# Patient Record
Sex: MALE | Race: WHITE
[De-identification: names, ages, dates, MRNs, and addresses within clinical notes are randomized per-mention and may not be internally consistent; named-entity substitution may affect disease eponyms.]

---

## 2017-05-05 ENCOUNTER — HOSPITAL ENCOUNTER (OUTPATIENT)
Dept: HOSPITAL 62 - END | Age: 42
Discharge: HOME | End: 2017-05-05
Attending: INTERNAL MEDICINE
Payer: COMMERCIAL

## 2017-05-05 VITALS — SYSTOLIC BLOOD PRESSURE: 123 MMHG | DIASTOLIC BLOOD PRESSURE: 74 MMHG

## 2017-05-05 DIAGNOSIS — K62.5: ICD-10-CM

## 2017-05-05 DIAGNOSIS — K29.50: ICD-10-CM

## 2017-05-05 DIAGNOSIS — Z88.1: ICD-10-CM

## 2017-05-05 DIAGNOSIS — K21.0: ICD-10-CM

## 2017-05-05 DIAGNOSIS — Z88.0: ICD-10-CM

## 2017-05-05 DIAGNOSIS — K22.2: Primary | ICD-10-CM

## 2017-05-05 DIAGNOSIS — Z79.899: ICD-10-CM

## 2017-05-05 PROCEDURE — 88305 TISSUE EXAM BY PATHOLOGIST: CPT

## 2017-05-05 PROCEDURE — 0DB58ZX EXCISION OF ESOPHAGUS, VIA NATURAL OR ARTIFICIAL OPENING ENDOSCOPIC, DIAGNOSTIC: ICD-10-PCS | Performed by: INTERNAL MEDICINE

## 2017-05-05 PROCEDURE — 88342 IMHCHEM/IMCYTCHM 1ST ANTB: CPT

## 2017-05-05 PROCEDURE — 43239 EGD BIOPSY SINGLE/MULTIPLE: CPT

## 2017-05-05 PROCEDURE — 0DB68ZX EXCISION OF STOMACH, VIA NATURAL OR ARTIFICIAL OPENING ENDOSCOPIC, DIAGNOSTIC: ICD-10-PCS | Performed by: INTERNAL MEDICINE

## 2017-05-05 RX ADMIN — MIDAZOLAM HYDROCHLORIDE ONE MG: 1 INJECTION, SOLUTION INTRAMUSCULAR; INTRAVENOUS at 11:41

## 2017-05-05 RX ADMIN — MIDAZOLAM HYDROCHLORIDE ONE MG: 1 INJECTION, SOLUTION INTRAMUSCULAR; INTRAVENOUS at 11:38

## 2017-05-05 NOTE — OPERATIVE REPORT
Operative Report


DATE OF SURGERY: 05/05/17


Operative Report: 


The risks benefits and alternatives of the procedure explained to the patient 

in detail and informed consent is obtained that GIF Olympus video scope was 

inserted into the patient's mouth and hypopharynx the esophagus is identified 

intubated and insufflated the scope was then advanced through the esophagus 

stomach and duodenum retroflexion maneuver is done the esophagus stomach and 

first and second portions of the duodenum examined


PREOPERATIVE DIAGNOSIS: GERD.  Epigastric pain.  Use of doxycycline in the past


POSTOPERATIVE DIAGNOSIS: Esophageal rings and furrows status post biopsy.  

Gastritis as was biopsy rule out Helicobacter pylori.  Distal esophageal 

inflammation suggestive of a pill-induced esophagitis


OPERATION: EGD with biopsy


SURGEON: AMRIK LEON


ANESTHESIA: Moderate Sedation - 3 mg of Versed, 100g of fentanyl.  Conscious 

sedation monitoring time 30 minutes.


TISSUE REMOVED OR ALTERED: Esophageal specimens obtained.  Gastric specimens 

obtained


COMPLICATIONS: 


None.


ESTIMATED BLOOD LOSS: none.


INTRAOPERATIVE FINDINGS: As noted above.


PROCEDURE: 


Patient tolerated procedure well.


No immediate postprocedure complications are noted.


Patient discharged in good condition.


Discharge date 05/05/2017.


Discharge diet: Regular.


Discharge activity: Regular.


2-3 week follow-up to discuss findings.


Patient is instructed call the office or proceed to the emergency room should 

there be any further problems or questions.


We'll await on biopsies

## 2018-08-19 ENCOUNTER — HOSPITAL ENCOUNTER (EMERGENCY)
Dept: HOSPITAL 62 - ER | Age: 43
Discharge: HOME | End: 2018-08-19
Payer: SELF-PAY

## 2018-08-19 VITALS — DIASTOLIC BLOOD PRESSURE: 85 MMHG | SYSTOLIC BLOOD PRESSURE: 127 MMHG

## 2018-08-19 DIAGNOSIS — Z88.0: ICD-10-CM

## 2018-08-19 DIAGNOSIS — R07.9: Primary | ICD-10-CM

## 2018-08-19 DIAGNOSIS — Z88.3: ICD-10-CM

## 2018-08-19 DIAGNOSIS — R06.02: ICD-10-CM

## 2018-08-19 LAB
ADD MANUAL DIFF: NO
ALBUMIN SERPL-MCNC: 4.5 G/DL (ref 3.5–5)
ALP SERPL-CCNC: 92 U/L (ref 38–126)
ALT SERPL-CCNC: 37 U/L (ref 21–72)
ANION GAP SERPL CALC-SCNC: 11 MMOL/L (ref 5–19)
AST SERPL-CCNC: 37 U/L (ref 17–59)
BASOPHILS # BLD AUTO: 0 10^3/UL (ref 0–0.2)
BASOPHILS NFR BLD AUTO: 0.5 % (ref 0–2)
BILIRUB DIRECT SERPL-MCNC: 0.3 MG/DL (ref 0–0.4)
BILIRUB SERPL-MCNC: 0.7 MG/DL (ref 0.2–1.3)
BUN SERPL-MCNC: 20 MG/DL (ref 7–20)
CALCIUM: 9.6 MG/DL (ref 8.4–10.2)
CHLORIDE SERPL-SCNC: 102 MMOL/L (ref 98–107)
CO2 SERPL-SCNC: 30 MMOL/L (ref 22–30)
EOSINOPHIL # BLD AUTO: 0 10^3/UL (ref 0–0.6)
EOSINOPHIL NFR BLD AUTO: 1 % (ref 0–6)
ERYTHROCYTE [DISTWIDTH] IN BLOOD BY AUTOMATED COUNT: 12.6 % (ref 11.5–14)
GLUCOSE SERPL-MCNC: 98 MG/DL (ref 75–110)
HCT VFR BLD CALC: 43.5 % (ref 37.9–51)
HGB BLD-MCNC: 15.4 G/DL (ref 13.5–17)
LYMPHOCYTES # BLD AUTO: 1.3 10^3/UL (ref 0.5–4.7)
LYMPHOCYTES NFR BLD AUTO: 28.1 % (ref 13–45)
MCH RBC QN AUTO: 32.7 PG (ref 27–33.4)
MCHC RBC AUTO-ENTMCNC: 35.3 G/DL (ref 32–36)
MCV RBC AUTO: 93 FL (ref 80–97)
MONOCYTES # BLD AUTO: 0.2 10^3/UL (ref 0.1–1.4)
MONOCYTES NFR BLD AUTO: 4.6 % (ref 3–13)
NEUTROPHILS # BLD AUTO: 3 10^3/UL (ref 1.7–8.2)
NEUTS SEG NFR BLD AUTO: 65.8 % (ref 42–78)
PLATELET # BLD: 161 10^3/UL (ref 150–450)
POTASSIUM SERPL-SCNC: 4.2 MMOL/L (ref 3.6–5)
PROT SERPL-MCNC: 7.2 G/DL (ref 6.3–8.2)
RBC # BLD AUTO: 4.71 10^6/UL (ref 4.35–5.55)
SODIUM SERPL-SCNC: 142.9 MMOL/L (ref 137–145)
TOTAL CELLS COUNTED % (AUTO): 100 %
WBC # BLD AUTO: 4.6 10^3/UL (ref 4–10.5)

## 2018-08-19 PROCEDURE — 85025 COMPLETE CBC W/AUTO DIFF WBC: CPT

## 2018-08-19 PROCEDURE — 93005 ELECTROCARDIOGRAM TRACING: CPT

## 2018-08-19 PROCEDURE — 84484 ASSAY OF TROPONIN QUANT: CPT

## 2018-08-19 PROCEDURE — 99285 EMERGENCY DEPT VISIT HI MDM: CPT

## 2018-08-19 PROCEDURE — 93010 ELECTROCARDIOGRAM REPORT: CPT

## 2018-08-19 PROCEDURE — 36415 COLL VENOUS BLD VENIPUNCTURE: CPT

## 2018-08-19 PROCEDURE — 71045 X-RAY EXAM CHEST 1 VIEW: CPT

## 2018-08-19 PROCEDURE — 80053 COMPREHEN METABOLIC PANEL: CPT

## 2018-08-19 NOTE — RADIOLOGY REPORT (SQ)
EXAM DESCRIPTION: 



XR CHEST 1 VIEW



COMPLETED DATE/TME:  08/19/2018 01:22



CLINICAL HISTORY: 



42 years, Male, chest pain



COMPARISON:

None.



NUMBER OF VIEWS:

One



TECHNIQUE:

AP view of the chest



LIMITATIONS:

None.



FINDINGS:



The lungs are clear. The heart is normal in size. There is no

pneumothorax or pleural effusion. There is no acute fracture



IMPRESSION:



No acute cardiopulmonary abnormality

 



 2011 [employer]- All Rights Reserved

## 2018-08-19 NOTE — ER DOCUMENT REPORT
ED Medical Screen (RME)





- General


Chief Complaint: Chest Pain


Stated Complaint: SHORT OF BREATH


Time Seen by Provider: 08/19/18 01:19


Notes: 





Patient presents for concern of chest pain.  Patient states that approximately 

between 7 and 8 PM today he began experiencing pressure in his midsternal chest 

region.  He then began to have some mild shortness of breath and numbness going 

down his left arm.  He has never experienced these symptoms before.  He took a 

total of 3 [place] in the next several hours after the symptoms began and 

ultimately came to the emergency department for concern of his chest pain.  He 

states he is currently having mild discomfort in his chest and he still has 

tingling in his hand but the symptoms have subsided since initial onset.


He states he exercises daily, does not take any drugs, and is an occasional 

drinker of beer. No known medical problems. 


His primary concern is due to his father having a massive heart attack and 

dying unexpectedly at the age of 59 but no other concerning family history.


He has not had any dyspnea with exertion as he does exercise daily.


TRAVEL OUTSIDE OF THE [place] IN LAST 30 DAYS: No





- Related Data


Allergies/Adverse Reactions: 


 





doxycycline Adverse Reaction (Verified 05/05/17 10:29)


 Rash


Penicillins Adverse Reaction (Verified 05/05/17 10:29)


 Rash


MYCINS Allergy (Uncoded 05/05/17 10:29)


 Rash











Past Medical History





- Past Medical History


Cardiac Medical History: 


   Denies: Hx Coronary Artery Disease, Hx Heart Attack, Hx Hypertension


Pulmonary Medical History: 


   Denies: Hx Asthma, Hx Bronchitis, Hx COPD, Hx Pneumonia


Neurological Medical History: Denies: Hx Cerebrovascular Accident, Hx Seizures


Musculoskeltal Medical History: Denies Hx Arthritis





- Immunizations


Hx Diphtheria, Pertussis, Tetanus Vaccination: No - UNSURE





Physical Exam





- Vital signs


Vitals: 





 











Temp Pulse Resp BP Pulse Ox


 


 99.0 F   82   15   141/88 H  98 


 


 08/19/18 00:21  08/19/18 00:21  08/19/18 00:21 08/19/18 00:21  08/19/18 00:21














Course





- Vital Signs


Vital signs: 





 











Temp Pulse Resp BP Pulse Ox


 


 99.0 F   82   15   141/88 H  98 


 


 08/19/18 00:21  08/19/18 00:21  08/19/18 00:21  08/19/18 00:21  08/19/18 00:21

## 2018-08-19 NOTE — ER DOCUMENT REPORT
ED General





- General


Chief Complaint: Chest Pain


Stated Complaint: SHORT OF BREATH


Time Seen by Provider: 18 01:19


Notes: 


Patient is a 42-year-old male that comes by EMS for chief complaint of chest 

pain.  He states symptoms started about 7 PM, he states it felt like it was a 

pinching between his shoulder blades with a tightness in his chest, he states 

he got "really anxious about it" and started feeling increased tightness and 

numbness in his left arm.  She denies shortness of breath, dizziness, passing 

out, nausea, or vomiting.  He states he did suddenly feel like he could not eat 

however.  He denies smoking, he drinks alcohol daily, he denies any 

recreational drugs.  He denies any daily medications.  He had an endoscopy once 

and he was told he had an ulcer, he used to take omeprazole for this but no 

longer takes it.  He denies any current symptoms, symptoms resolved in less 

than an hour.  Positive family history of MI, states his father  from heart 

attack at about 60.


TRAVEL OUTSIDE OF THE [place] IN LAST 30 DAYS: No





- Related Data


Allergies/Adverse Reactions: 


 





doxycycline Adverse Reaction (Verified 17 10:29)


 Rash


Penicillins Adverse Reaction (Verified 17 10:29)


 Rash


MYCINS Allergy (Uncoded 17 10:29)


 Rash











Past Medical History





- General


Information source: Patient





- Social History


Smoking Status: Never Smoker


Chew tobacco use (# tins/day): No


Frequency of alcohol use: Daily


Drug Abuse: None


Lives with: Family


Family History: CAD


Patient has suicidal ideation: No


Patient has homicidal ideation: No





- Medical History


Medical History: Negative





- Past Medical History


Cardiac Medical History: 


   Denies: Hx Coronary Artery Disease, Hx Heart Attack, Hx Hypertension


Pulmonary Medical History: 


   Denies: Hx Asthma, Hx Bronchitis, Hx COPD, Hx Pneumonia


Neurological Medical History: Denies: Hx Cerebrovascular Accident, Hx Seizures


Renal/ Medical History: Denies: Hx Peritoneal Dialysis


Musculoskeletal Medical History: Denies Hx Arthritis


Surgical Hx: Negative





- Immunizations


Hx Diphtheria, Pertussis, Tetanus Vaccination: Yes





Review of Systems





- Review of Systems


Constitutional: No symptoms reported


EENT: No symptoms reported


Cardiovascular: See HPI


Respiratory: No symptoms reported


Gastrointestinal: See HPI


Genitourinary: No symptoms reported


Male Genitourinary: No symptoms reported


Musculoskeletal: No symptoms reported


Skin: No symptoms reported


Hematologic/Lymphatic: No symptoms reported


Neurological/Psychological: See HPI





Physical Exam





- Vital signs


Vitals: 


 











Temp Pulse Resp BP Pulse Ox


 


 99.0 F   82   15   141/88 H  98 


 


 18 00:21  18 00:21  18 00:21  18 00:21  18 00:21














- Notes


Notes: 





GENERAL: Alert, interacts well. No acute distress.


HEAD: Normocephalic, atraumatic.


EYES: Pupils equal, round, and reactive to light. Extraocular movements intact.


ENT: Oral mucosa moist, tongue midline. 


NECK: Full range of motion. Supple. Trachea midline.


LUNGS: Clear to auscultation bilaterally, no wheezes, rales, or rhonchi. No 

respiratory distress.


HEART: Regular rate and rhythm. No murmur


ABDOMEN: Soft, non-tender. Non-distended. Bowel sounds present in all 4 

quadrants.


EXTREMITIES: Moves all 4 extremities spontaneously. No edema, normal radial and 

dorsalis pedis pulses bilaterally. No cyanosis.


BACK: no cervical, thoracic, lumbar midline tenderness. No saddle anesthesia, 

normal distal neurovascular exam. 


NEUROLOGICAL: Alert and oriented x3. Normal speech. [cranial nerves II through 

XII grossly intact]. 


PSYCH: Normal affect, normal mood.


SKIN: Warm, dry, normal turgor. No rashes or lesions noted.








Course





- Re-evaluation


Re-evalutation: 


Patient asymptomatic on my exam.  He has taken aspirin already, took over 900 

mg at home.  Counseled him on this.





Patient's reported symptoms are somewhat concerning although clouded by the 

fact that he states he got really anxious and started having additional 

symptoms with a heart attack.  Given his history and risk factors his heart 

score is 2.  EKG sinus rhythm with no T-wave inversions or ST segment changes 

in consecutive leads.  Chest x-ray unremarkable.  Remaining workup 

unremarkable.  To cycle troponins are negative.  Patient without any symptoms 

on reevaluation.  Discussed options with patient.  Patient states he would like 

to go home, and follow-up with cardiology closely.  Discussed daily alcohol use

, possible gastrointestinal symptoms, cardiac follow-up detail on Monday, and 

return precautions in detail with patient.  Patient states satisfaction and 

agreement.





- Vital Signs


Vital signs: 


 











Temp Pulse Resp BP Pulse Ox


 


 99.0 F   82   18   127/85 H  98 


 


 18 00:21  18 00:21  18 05:01  18 05:01  18 05:01














- Laboratory


Result Diagrams: 


 18 02:20





 18 02:20





Discharge





- Discharge


Clinical Impression: 


Chest pain


Qualifiers:


 Chest pain type: unspecified Qualified Code(s): R07.9 - Chest pain, unspecified





Condition: Stable


Disposition: HOME, SELF-CARE


Additional Instructions: 


Your workup tonight does not show any concerning findings.


Your recommendation is to take the antacids that you have at home (famotidine, 

Prilosec, or both), over the next several days reduce alcohol, avoid caffeine, 

NSAIDs, and spicy foods.  In addition to this because the exact cause of your 

symptoms is uncertain I recommend that you perform the close cardiac follow-up 

or at least a close follow-up with your provider for additional evaluation and 

management including possible stress test.  Return if you worsen including 

severe pain, difficulty breathing, passing out, or any other concerning or 

worsening symptoms.


Referrals: 


SHANNAN DIOR MD [ACTIVE STAFF] - 18

## 2018-08-19 NOTE — EKG REPORT
SEVERITY:- BORDERLINE ECG -

SINUS RHYTHM

PROBABLE LEFT ATRIAL ABNORMALITY

:

Confirmed by: Damian Harris MD 19-Aug-2018 07:56:52